# Patient Record
Sex: MALE | Race: WHITE | URBAN - METROPOLITAN AREA
[De-identification: names, ages, dates, MRNs, and addresses within clinical notes are randomized per-mention and may not be internally consistent; named-entity substitution may affect disease eponyms.]

---

## 2017-04-27 ENCOUNTER — TELEPHONE (OUTPATIENT)
Dept: FAMILY MEDICINE | Facility: CLINIC | Age: 42
End: 2017-04-27

## 2017-04-27 NOTE — TELEPHONE ENCOUNTER
The following mychart appointment request was made by the patient.  Difficulty swallowing, persistent cough. Also overdue       for tetanus shot according to MyChart list     He is scheduled for 4/28 at 4PM  Routing to Triage to see if this requires a phone call  Mervat KELLER

## 2017-04-27 NOTE — TELEPHONE ENCOUNTER
Spoke with patient who report that he is doing okay.  No problem with breathing  Depend on the type of food, it may take a little longer to swallow them down.  Had this problem for about 1 year.     Report that he has no problem with swallowing otherwise.  Just thought he comes in for further eval and advise.      Michelle Navarro RN

## 2017-04-28 ENCOUNTER — OFFICE VISIT (OUTPATIENT)
Dept: FAMILY MEDICINE | Facility: CLINIC | Age: 42
End: 2017-04-28
Payer: COMMERCIAL

## 2017-04-28 VITALS
DIASTOLIC BLOOD PRESSURE: 72 MMHG | TEMPERATURE: 98 F | OXYGEN SATURATION: 98 % | HEIGHT: 74 IN | SYSTOLIC BLOOD PRESSURE: 114 MMHG | HEART RATE: 80 BPM | RESPIRATION RATE: 16 BRPM | WEIGHT: 233 LBS | BODY MASS INDEX: 29.9 KG/M2

## 2017-04-28 DIAGNOSIS — K22.2 SCHATZKI'S RING: Primary | ICD-10-CM

## 2017-04-28 DIAGNOSIS — K21.9 GASTROESOPHAGEAL REFLUX DISEASE, ESOPHAGITIS PRESENCE NOT SPECIFIED: ICD-10-CM

## 2017-04-28 PROCEDURE — 99213 OFFICE O/P EST LOW 20 MIN: CPT | Performed by: INTERNAL MEDICINE

## 2017-04-28 NOTE — MR AVS SNAPSHOT
After Visit Summary   4/28/2017    Carl Glover    MRN: [id number]           Patient Information     Date Of Birth          1975        Visit Information        Provider Department      4/28/2017 4:00 PM Dolores Ferrell MD [facility] [place]        Today's Diagnoses     Schatzki's ring    -  1    Gastroesophageal reflux disease, esophagitis presence not specified           Follow-ups after your visit        Additional Services     GASTROENTEROLOGY ADULT REF CONSULT ONLY       Preferred Location: [facility] [phone]      Please be aware that coverage of these services is subject to the terms and limitations of your health insurance plan.  Call member services at your health plan with any benefit or coverage questions.  Any procedures must be performed at a [facility] facility OR coordinated by your clinic's referral office.    Please bring the following with you to your appointment:    (1) Any X-Rays, CTs or MRIs which have been performed.  Contact the facility where they were done to arrange for  prior to your scheduled appointment.    (2) List of current medications   (3) This referral request   (4) Any documents/labs given to you for this referral                  Who to contact     If you have questions or need follow up information about today's clinic visit or your schedule please contact [facility] [place] directly at [phone].  Normal or non-critical lab and imaging results will be communicated to you by MyChart, letter or phone within 4 business days after the clinic has received the results. If you do not hear from us within 7 days, please contact the clinic through MyChart or phone. If you have a critical or abnormal lab result, we will notify you by phone as soon as possible.  Submit refill requests through [employer] or call your pharmacy and they will forward the refill request to us. Please allow 3 business days for your refill to be completed.        "   Additional Information About Your Visit        MyChart Information     [email or web address] gives you secure access to your electronic health record. If you see a primary care provider, you can also send messages to your care team and make appointments. If you have questions, please call your primary care clinic.  If you do not have a primary care provider, please call [phone] and they will assist you.        Care EveryWhere ID     This is your Care EveryWhere ID. This could be used by other organizations to access your [facility] medical records  [id number]        Your Vitals Were     Pulse Temperature Respirations Height Pulse Oximetry BMI (Body Mass Index)    80 98  F (36.7  C) 16 6' 2\" (1.88 m) 98% 29.92 kg/m2       Blood Pressure from Last 3 Encounters:   04/28/17 114/72   11/06/15 118/78   08/08/14 131/88    Weight from Last 3 Encounters:   04/28/17 233 lb (105.7 kg)   11/06/15 226 lb (102.5 kg)   08/08/14 228 lb (103.4 kg)              We Performed the Following     GASTROENTEROLOGY ADULT REF CONSULT ONLY        Primary Care Provider Office Phone # Fax #    Darin Garcia [phone]       [address] [place] [address] [place] [id number]        Thank you!     Thank you for choosing [facility]  for your care. Our goal is always to provide you with excellent care. Hearing back from our patients is one way we can continue to improve our services. Please take a few minutes to complete the written survey that you may receive in the mail after your visit with us. Thank you!             Your Updated Medication List - Protect others around you: Learn how to safely use, store and throw away your medicines at www.disposemymeds.org.      Notice  As of 4/28/2017  4:24 PM    You have not been prescribed any medications.      "

## 2017-04-28 NOTE — PROGRESS NOTES
"Chief Complaint   Patient presents with     Gastric Problem       Initial /72  Pulse 80  Temp 98  F (36.7  C)  Resp 16  Ht 6' 2\" (1.88 m)  Wt 233 lb (105.7 kg)  SpO2 98%  BMI 29.92 kg/m2 Estimated body mass index is 29.92 kg/(m^2) as calculated from the following:    Height as of this encounter: 6' 2\" (1.88 m).    Weight as of this encounter: 233 lb (105.7 kg).  Medication Reconciliation: complete. CLEMENTINA Bailey LPN      SUBJECTIVE:                                                    Carl Glover is a 41 year old male who presents to clinic today for the following health issues:      Concern - increased reflux      Onset: feels like food is getting stuck    Description:   Has had emesis    Intensity: moderate    Progression of Symptoms:  worsening    Accompanying Signs & Symptoms:  Hx of esophagus stretching procedure approx 1 year ago       Previous history of similar problem:   yes    Precipitating factors:   Worsened by: rice and certain foods    Alleviating factors:  Improved by: nothing       Therapies Tried and outcome: nothing    Diagnosed with a Schatzki's ring one year ago. He was sent to [facility] for dilation. He went on a strict low-acid and no caffeine diet. Things had been better but now for the past few months he feels like things are getting stuck again. He has been coughing more again.       Problem list and histories reviewed & adjusted, as indicated.  Additional history: as documented    Patient Active Problem List   Diagnosis     Family history of other cardiovascular diseases     ED (erectile dysfunction)     Sweating profusely     Overweight (BMI 25.0-29.9)     Hyperlipidemia LDL goal <130     Low HDL (under 40)     Gastroesophageal reflux disease without esophagitis     Chronic rhinitis     Past Surgical History:   Procedure Laterality Date     NO HISTORY OF SURGERY         Social History   Substance Use Topics     Smoking status: Never Smoker     Smokeless tobacco: Never " "Used     Alcohol use 0.0 oz/week     0 Standard drinks or equivalent per week      Comment: less than one drink per week      Family History   Problem Relation Age of Onset     Breast Cancer Mother       age 62     C.A.D. Father       age 54 MI     C.A.D. Maternal Grandmother      MI x 2     DIABETES Maternal Grandmother      Hypertension Maternal Grandmother      CEREBROVASCULAR DISEASE Maternal Grandmother      Lipids Maternal Grandmother      C.A.D. Paternal Grandmother      C.A.D. Paternal Grandfather      CABG at age 62           Reviewed and updated as needed this visit by clinical staff  Tobacco  Allergies  Meds  Surg Hx  Fam Hx  Soc Hx      Reviewed and updated as needed this visit by Provider         ROS:  Constitutional, HEENT, cardiovascular, pulmonary, gi and gu systems are negative, except as otherwise noted.    OBJECTIVE:                                                    /72  Pulse 80  Temp 98  F (36.7  C)  Resp 16  Ht 6' 2\" (1.88 m)  Wt 233 lb (105.7 kg)  SpO2 98%  BMI 29.92 kg/m2  Body mass index is 29.92 kg/(m^2).  GENERAL: healthy, alert and no distress  NECK: no adenopathy, no asymmetry, masses, or scars and thyroid normal to palpation  RESP: lungs clear to auscultation - no rales, rhonchi or wheezes  CV: regular rate and rhythm, normal S1 S2, no S3 or S4, no murmur, click or rub, no peripheral edema and peripheral pulses strong  ABDOMEN: soft, nontender, no hepatosplenomegaly, no masses and bowel sounds normal  MS: no gross musculoskeletal defects noted, no edema    Diagnostic Test Results:  none      ASSESSMENT/PLAN:                                                        1. Caro hammond  Discussed repeating EGD but patient would like to consult with GI formally first. Placing referral today. Recommending that he start an OTC acid suppressant such as Zantac or Prilosec.   - GASTROENTEROLOGY ADULT REF CONSULT ONLY    2. Gastroesophageal reflux disease, esophagitis " presence not specified  - GASTROENTEROLOGY ADULT REF CONSULT ONLY    Follow up as needed.      Dolores Ferrell MD  [facility] [place]

## 2017-07-25 ENCOUNTER — TRANSFERRED RECORDS (OUTPATIENT)
Dept: HEALTH INFORMATION MANAGEMENT | Facility: CLINIC | Age: 42
End: 2017-07-25

## 2017-08-30 ENCOUNTER — TRANSFERRED RECORDS (OUTPATIENT)
Dept: HEALTH INFORMATION MANAGEMENT | Facility: CLINIC | Age: 42
End: 2017-08-30

## 2017-09-08 PROBLEM — K22.2 SCHATZKI'S RING: Status: ACTIVE | Noted: 2017-09-08

## 2017-09-08 PROBLEM — K22.10 EROSIVE ESOPHAGITIS: Status: ACTIVE | Noted: 2017-09-08

## 2017-09-08 PROBLEM — K31.89 EROSIVE GASTROPATHY: Status: ACTIVE | Noted: 2017-09-08

## 2017-09-13 ENCOUNTER — TRANSFERRED RECORDS (OUTPATIENT)
Dept: HEALTH INFORMATION MANAGEMENT | Facility: CLINIC | Age: 42
End: 2017-09-13

## 2017-10-18 ENCOUNTER — HOSPITAL ENCOUNTER (OUTPATIENT)
Dept: GENERAL RADIOLOGY | Facility: CLINIC | Age: 42
Discharge: HOME | End: 2017-10-18
Attending: INTERNAL MEDICINE | Admitting: INTERNAL MEDICINE
Payer: COMMERCIAL

## 2017-10-18 ENCOUNTER — HOSPITAL ENCOUNTER (OUTPATIENT)
Dept: SPEECH THERAPY | Facility: CLINIC | Age: 42
End: 2017-10-18
Attending: INTERNAL MEDICINE
Payer: COMMERCIAL

## 2017-10-18 DIAGNOSIS — K21.9 GASTRIC REFLUX: ICD-10-CM

## 2017-10-18 PROCEDURE — 40000225 ZZH STATISTIC SLP WARD VISIT: Performed by: SPEECH-LANGUAGE PATHOLOGIST

## 2017-10-18 PROCEDURE — 92526 ORAL FUNCTION THERAPY: CPT | Mod: GN | Performed by: SPEECH-LANGUAGE PATHOLOGIST

## 2017-10-18 PROCEDURE — 92611 MOTION FLUOROSCOPY/SWALLOW: CPT | Mod: GN | Performed by: SPEECH-LANGUAGE PATHOLOGIST

## 2017-10-18 PROCEDURE — 74230 X-RAY XM SWLNG FUNCJ C+: CPT

## 2017-10-18 NOTE — PROGRESS NOTES
10/18/17 1217       Present No   General Information   Type Of Visit Initial   Start Of Care Date 10/18/17   Referring Physician Dr. Montoya   Orders Evaluate And Treat   Medical Diagnosis Dysphagia, unspecified    Onset Of Illness/injury Or Date Of Surgery 10/18/15   Precautions/limitations No Known Precautions/limitations   General Information Comments This 42 year old male was referred for a video swallow study from GI due to globus sensation as pt pointed to his throat. History of schatzki ring and erosive esophagitis. He is currently taking PPI twice a day and reports continued difficulty with post nasal drip and sensation of mucus in his throat.    VFSS Eval: Radiology   Radiologist Dr. Saunders   Views Taken left lateral   Physical Location of Procedure [facility] Room 1   VFSS Eval: Thin Liquid Texture Trial   Mode of Presentation, Thin Liquid straw;self-fed;cup   Order of Presentation 1, 2, 4   Preparatory Phase WFL   Oral Phase, Thin Liquid WFL   Pharyngeal Phase, Thin Liquid Residue in valleculae   Rosenbek's Penetration Aspiration Scale: Thin Liquid Trial Results 2 - contrast enters airway, remains above the vocal cords, no residue remains (penetration)   Diagnostic Statement Flash penetration    VFSS Eval: Solid Food Texture Trial   Mode of Presentation, Solid self-fed   Order of Presentation 3   Preparatory Phase WFL   Oral Phase, Solid WFL   Pharyngeal Phase, Solid Residue in valleculae   Rosenbek's Penetration Aspiration Scale: Solid Food Trial Results 1 - no aspiration, contrast does not enter airway   Diagnostic Statement Mild vallecular residue   Esophageal Phase of Swallow   Patient reports or presents with symptoms of esophageal dysphagia Yes   Esophageal sweep performed during today s vidofluoroscopic exam  Please refer to radiologist's report for details   Esophageal comments Esophagram today following video   General Therapy Interventions   Planned Therapy  Interventions Dysphagia Treatment   Dysphagia treatment Instruction of safe swallow strategies;Oropharyngeal exercise training   Intervention Comments Pt and wife verbalized understanding   Swallow Eval: Clinical Impressions   Skilled Criteria for Therapy Intervention Skilled criteria met.  Treatment indicated.   Functional Assessment Scale (FAS) 6   Treatment Diagnosis Minimal dysphagia   Diet texture recommendations Regular diet;Thin liquids   Recommended Feeding/Eating Techniques alternate between small bites and sips of food/liquid;hard swallow w/ each bite or sip;maintain upright posture during/after eating for 30 mins;no straws   Rehab Potential good, to achieve stated therapy goals   Therapy Frequency (1 sessions)   Predicted Duration of Therapy Intervention (days/wks) 1 day   Anticipated Discharge Disposition home   Risks and Benefits of Treatment have been explained. Yes   Patient, family and/or staff in agreement with Plan of Care Yes   Clinical Impression Comments SLP: Pt presents with minimal pharyngeal dysphagia on video swallow study. Pt reported new difficulty with globus sensation in throat and post nasal drip. Pt started a nasal spray yesterday. Oral phase WFL. No deficits noted with thin liquids via cup and straw. With serial straw sips of thin liquids, flash penetration noted with minimally reduced pharyngeal contraction. Timely swallow with regular texture cracker. Mild vallecular residue noted that cleared with consecutive swallows and liquid wash. No aspiration noted throughout exam. Recommended: continue regular diet and thin liquids; avoid straws; alternate solids and liquids. Pt and wife educated on compensatory strategies and pharyngeal exercises to improve residue that may contribute to globus sensation. Pt to follow with esophagram and follow up with GI to discuss esophageal phase. No further ST recommended at this time.    Swallow Goals   SLP Swallow Goals 1   Swallow Goal 1   Goal  Identifier Education   Goal Description Pt and wife will verbalize understanding of strategies and exercises as needed.    Target Date 10/18/17   Date Met 10/18/17   Total Session Time   Total Session Time 65   Total Evaluation Time 34

## 2019-11-04 ENCOUNTER — HEALTH MAINTENANCE LETTER (OUTPATIENT)
Age: 44
End: 2019-11-04

## 2020-11-22 ENCOUNTER — HEALTH MAINTENANCE LETTER (OUTPATIENT)
Age: 45
End: 2020-11-22

## 2021-09-18 ENCOUNTER — HEALTH MAINTENANCE LETTER (OUTPATIENT)
Age: 46
End: 2021-09-18

## 2022-01-08 ENCOUNTER — HEALTH MAINTENANCE LETTER (OUTPATIENT)
Age: 47
End: 2022-01-08

## 2022-11-20 ENCOUNTER — HEALTH MAINTENANCE LETTER (OUTPATIENT)
Age: 47
End: 2022-11-20

## 2023-04-15 ENCOUNTER — HEALTH MAINTENANCE LETTER (OUTPATIENT)
Age: 48
End: 2023-04-15